# Patient Record
Sex: MALE | Race: BLACK OR AFRICAN AMERICAN | NOT HISPANIC OR LATINO | Employment: STUDENT | URBAN - METROPOLITAN AREA
[De-identification: names, ages, dates, MRNs, and addresses within clinical notes are randomized per-mention and may not be internally consistent; named-entity substitution may affect disease eponyms.]

---
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
## 2021-10-29 ENCOUNTER — HOSPITAL ENCOUNTER (OUTPATIENT)
Dept: RADIOLOGY | Facility: HOSPITAL | Age: 21
Discharge: HOME | End: 2021-10-29
Payer: COMMERCIAL

## 2021-10-29 DIAGNOSIS — S86.811D RUPTURE OF PATELLAR TENDON, RIGHT, SUBSEQUENT ENCOUNTER: ICD-10-CM

## 2021-10-29 PROCEDURE — 93971 EXTREMITY STUDY: CPT | Mod: TC,PO,RT

## 2021-12-26 ENCOUNTER — HOSPITAL ENCOUNTER (EMERGENCY)
Facility: HOSPITAL | Age: 21
Discharge: HOME | End: 2021-12-26
Attending: EMERGENCY MEDICINE
Payer: COMMERCIAL

## 2021-12-26 DIAGNOSIS — Z20.822 EXPOSURE TO COVID-19 VIRUS: Primary | ICD-10-CM

## 2021-12-26 DIAGNOSIS — R11.0 NAUSEA: ICD-10-CM

## 2021-12-26 PROCEDURE — U0003 INFECTIOUS AGENT DETECTION BY NUCLEIC ACID (DNA OR RNA); SEVERE ACUTE RESPIRATORY SYNDROME CORONAVIRUS 2 (SARS-COV-2) (CORONAVIRUS DISEASE [COVID-19]), AMPLIFIED PROBE TECHNIQUE, MAKING USE OF HIGH THROUGHPUT TECHNOLOGIES AS DESCRIBED BY CMS-2020-01-R: HCPCS | Mod: ER | Performed by: PHYSICIAN ASSISTANT

## 2021-12-26 PROCEDURE — U0005 INFEC AGEN DETEC AMPLI PROBE: HCPCS | Performed by: PHYSICIAN ASSISTANT

## 2021-12-26 PROCEDURE — 99283 EMERGENCY DEPT VISIT LOW MDM: CPT | Mod: ER

## 2021-12-26 PROCEDURE — 25000003 PHARM REV CODE 250: Mod: ER | Performed by: PHYSICIAN ASSISTANT

## 2021-12-26 RX ORDER — ONDANSETRON 4 MG/1
4 TABLET, ORALLY DISINTEGRATING ORAL
Status: COMPLETED | OUTPATIENT
Start: 2021-12-26 | End: 2021-12-26

## 2021-12-26 RX ORDER — IBUPROFEN 400 MG/1
800 TABLET ORAL
Status: COMPLETED | OUTPATIENT
Start: 2021-12-26 | End: 2021-12-26

## 2021-12-26 RX ORDER — ONDANSETRON 4 MG/1
4 TABLET, ORALLY DISINTEGRATING ORAL EVERY 6 HOURS PRN
Qty: 30 TABLET | Refills: 0 | Status: SHIPPED | OUTPATIENT
Start: 2021-12-26

## 2021-12-26 RX ADMIN — IBUPROFEN 800 MG: 400 TABLET, FILM COATED ORAL at 07:12

## 2021-12-26 RX ADMIN — ONDANSETRON 4 MG: 4 TABLET, ORALLY DISINTEGRATING ORAL at 07:12

## 2021-12-26 NOTE — Clinical Note
"David"SAMMIE Hammond was seen and treated in our emergency department on 12/26/2021.     COVID-19 is present in our communities across the state. There is limited testing for COVID at this time, so not all patients can be tested. In this situation, your employee meets the following criteria:    David Hammond has met the criteria for COVID-19 testing based upon symptoms, travel, and/or potential exposure. The test has been completed and is pending results at this time. During this time the employee is not able to work and should be quarantined per the Centers for Disease Control timelines.     If you have any questions or concerns, or if I can be of further assistance, please do not hesitate to contact me.    Sincerely,             Jody Jorge PA-C"

## 2021-12-27 VITALS
WEIGHT: 180 LBS | DIASTOLIC BLOOD PRESSURE: 78 MMHG | HEIGHT: 73 IN | RESPIRATION RATE: 18 BRPM | HEART RATE: 106 BPM | TEMPERATURE: 101 F | SYSTOLIC BLOOD PRESSURE: 126 MMHG | BODY MASS INDEX: 23.86 KG/M2

## 2021-12-27 LAB
SARS-COV-2 RNA RESP QL NAA+PROBE: NOT DETECTED
SARS-COV-2- CYCLE NUMBER: NORMAL

## 2021-12-27 NOTE — ED PROVIDER NOTES
Encounter Date: 12/26/2021       History     Chief Complaint   Patient presents with    Abdominal Pain     Pt to the ER with c/o Headache, nausea, diarrhea, abdominal pain last few days.  Pt taking medications at home.      HPI: David Hammond, a 21 y.o. male  has a past medical history of Sickle cell trait.     He presents to the ED evaluation of abdominal pain, headache, nausea with diarrhea.  States that the abdominal pain is cramping in nature.  Denies any blood in vomitus or stool.  Denies known sick contacts.  No recent ingestion of antibiotics.  Patient is vaccinated for COVID.  Patient is otherwise healthy and has no past abdominal surgical history.    The history is provided by the patient.     Review of patient's allergies indicates:  No Known Allergies  Past Medical History:   Diagnosis Date    Sickle cell trait      Past Surgical History:   Procedure Laterality Date    ADENOIDECTOMY  10/19/2017    Dr. Braydon Nelson    TONSILLECTOMY  10/19/2017    Dr. Braydon Nelson     History reviewed. No pertinent family history.  Social History     Tobacco Use    Smoking status: Never Smoker    Smokeless tobacco: Never Used   Substance Use Topics    Alcohol use: No    Drug use: No     Review of Systems   Constitutional: Positive for chills and fever.   Respiratory: Negative for cough and shortness of breath.    Cardiovascular: Negative for chest pain.   Gastrointestinal: Positive for abdominal pain, diarrhea and nausea. Negative for vomiting.   Genitourinary: Negative for decreased urine volume and dysuria.   Musculoskeletal: Positive for myalgias.   Neurological: Positive for headaches. Negative for weakness.   Psychiatric/Behavioral: Negative for agitation.   All other systems reviewed and are negative.      Physical Exam     Initial Vitals [12/26/21 1901]   BP Pulse Resp Temp SpO2   126/78 106 18 (!) 103 °F (39.4 °C) --      MAP       --         Physical Exam    Nursing note and vitals reviewed.  Constitutional:  He appears well-developed and well-nourished. He is not diaphoretic. No distress.   HENT:   Head: Normocephalic and atraumatic.   Right Ear: External ear normal.   Left Ear: External ear normal.   Eyes: Conjunctivae are normal.   Cardiovascular: Normal rate and regular rhythm.   Pulmonary/Chest: No respiratory distress. He has no wheezes. He has no rhonchi. He has no rales.   Abdominal: Abdomen is soft. Bowel sounds are normal. He exhibits no distension. There is no abdominal tenderness. There is no rebound and no guarding.   Musculoskeletal:         General: Normal range of motion.     Neurological: He is alert and oriented to person, place, and time.   Skin: Skin is warm. Capillary refill takes less than 2 seconds.   Psychiatric: He has a normal mood and affect. Thought content normal.         ED Course   Procedures  Labs Reviewed   SARS-COV-2 (COVID-19) QUALITATIVE PCR          Imaging Results    None          Medications   ondansetron disintegrating tablet 4 mg (4 mg Oral Given 12/26/21 1908)   ibuprofen tablet 800 mg (800 mg Oral Given 12/26/21 1908)     Medical Decision Making:   Initial Assessment:   Diarrhea, abdominal pain, fever  Differential Diagnosis:   Differential Diagnosis includes, but is not limited to:  Bowel obstruction, incarcerated/strangulated hernia, ileus, appendicitis, cholecystitis, aspirated foreign body, esophageal food impaction, biliary colic, colitis/diverticulitis, gastroenteritis, esophagitis, hepatitis, pancreatitis, GERD, PUD, constipation, nephrolithiasis, UTI/pyelonephritis. Covid    ED Management:  Patient presents to the ER of diarrhea, nausea and generalized abdominal pain.  No abdominal tenderness on my exam.  Patient was found to be febrile upon intake.  Given Zofran antipyretic and improvement of his symptoms.  He was given information on symptomatic control at home with swab for COVID which is pending.  Instructed to quarantine until his test is resulted.  Instructed to  return with any new or worsening symptoms.  Patient verbalized understanding and agreement with plan.                      Clinical Impression:   Final diagnoses:  [Z20.822] Exposure to COVID-19 virus (Primary)  [R11.0] Nausea          ED Disposition Condition    Discharge Stable        ED Prescriptions     Medication Sig Dispense Start Date End Date Auth. Provider    ondansetron (ZOFRAN-ODT) 4 MG TbDL Take 1 tablet (4 mg total) by mouth every 6 (six) hours as needed. 30 tablet 12/26/2021  Jody Jorge PA-C        Follow-up Information     Follow up With Specialties Details Why Contact Info    Doc Victoria MD Pediatrics   [address]  [place] [address]  [phone]             Jody Jorge PA-C  12/26/21 8499

## 2021-12-27 NOTE — ED NOTES
Reviewed discharge instructions, quarantine, symptomatic care, and follow up with pt.  Encouraged hydration.  Pt verbalized understanding  Pt stable at time of discharge.

## (undated) DEVICE — PENCIL ROCKER SWITCH 10FT CORD

## (undated) DEVICE — KIT ANTIFOG

## (undated) DEVICE — SUCTION COAGULATOR 10FR 6IN

## (undated) DEVICE — CATH ALL PUR URTHL RR 10FR

## (undated) DEVICE — SEE MEDLINE ITEM 152496

## (undated) DEVICE — SEE MEDLINE ITEM 157117

## (undated) DEVICE — ELECTRODE REM PLYHSV RETURN 9